# Patient Record
Sex: FEMALE | URBAN - NONMETROPOLITAN AREA
[De-identification: names, ages, dates, MRNs, and addresses within clinical notes are randomized per-mention and may not be internally consistent; named-entity substitution may affect disease eponyms.]

---

## 2017-01-30 ENCOUNTER — APPOINTMENT (RX ONLY)
Dept: URBAN - NONMETROPOLITAN AREA CLINIC 7 | Facility: CLINIC | Age: 82
Setting detail: DERMATOLOGY
End: 2017-01-30

## 2017-01-30 VITALS — HEIGHT: 68 IN | WEIGHT: 145 LBS

## 2017-01-30 DIAGNOSIS — Z71.89 OTHER SPECIFIED COUNSELING: ICD-10-CM

## 2017-01-30 DIAGNOSIS — D485 NEOPLASM OF UNCERTAIN BEHAVIOR OF SKIN: ICD-10-CM

## 2017-01-30 DIAGNOSIS — L82.1 OTHER SEBORRHEIC KERATOSIS: ICD-10-CM

## 2017-01-30 DIAGNOSIS — L57.0 ACTINIC KERATOSIS: ICD-10-CM

## 2017-01-30 PROBLEM — D48.5 NEOPLASM OF UNCERTAIN BEHAVIOR OF SKIN: Status: ACTIVE | Noted: 2017-01-30

## 2017-01-30 PROCEDURE — 11100: CPT | Mod: 59

## 2017-01-30 PROCEDURE — ? OBSERVATION

## 2017-01-30 PROCEDURE — ? LIQUID NITROGEN

## 2017-01-30 PROCEDURE — ? BIOPSY BY SHAVE METHOD

## 2017-01-30 PROCEDURE — ? COUNSELING

## 2017-01-30 PROCEDURE — 99202 OFFICE O/P NEW SF 15 MIN: CPT | Mod: 25

## 2017-01-30 PROCEDURE — 17000 DESTRUCT PREMALG LESION: CPT

## 2017-01-30 ASSESSMENT — LOCATION SIMPLE DESCRIPTION DERM
LOCATION SIMPLE: NOSE
LOCATION SIMPLE: RIGHT UPPER BACK
LOCATION SIMPLE: LEFT CHEEK

## 2017-01-30 ASSESSMENT — LOCATION ZONE DERM
LOCATION ZONE: NOSE
LOCATION ZONE: TRUNK
LOCATION ZONE: FACE

## 2017-01-30 ASSESSMENT — LOCATION DETAILED DESCRIPTION DERM
LOCATION DETAILED: RIGHT MEDIAL UPPER BACK
LOCATION DETAILED: LEFT SUPERIOR CENTRAL MALAR CHEEK
LOCATION DETAILED: RIGHT NASAL DORSUM

## 2017-01-30 NOTE — HPI: SKIN LESION
Is This A New Presentation, Or A Follow-Up?: Growth
Has Your Skin Lesion Been Treated?: not been treated

## 2017-01-30 NOTE — PROCEDURE: LIQUID NITROGEN
Post-Care Instructions: I reviewed with the patient in detail post-care instructions. Patient is to wear sunprotection, and avoid picking at any of the treated lesions. Pt may apply Vaseline to crusted or scabbing areas.\\nWHAT TO EXPECT AFTER CRYOSURGERY (LIQUID NITROGEN) TREATMENT\\n\\n\\n Liquid Nitrogen is a very cold gas. In this liquid state it has a temperature of 320 degrees below zero Fahrenheit. Dermatologists use liquid nitrogen to treat certain skin growths such as warts, seborrheic and actinic keratoses, and a whole variety of other skin tumors. These skin growths are destroyed by the freezing action of this agent. With cryosurgery we have the advantage of being able to treat many skin growths and leave very little scarring. \\n\\n From a few hours to a few days after treatment, the area may blister, turn black, or form a scab. This is a desirable result. In some, no reaction is apparent.\\n\\n 1. You are allowed to get the area wet even immediately after treatment.\\n\\n 2. Most person have little or no pain from this treatment. You may have some swelling about the eyes, if cyrotherapy is performed on the forehead or temple.\\n\\n 3. It is not necessary to cover the area with a bandage. In fact, this is undesirable. Things heal better if left open to the air. You should protect the area from injury as much as possible. \\n\\n 4. Painful large blisters (even blisters filled with blood) can occur at times. These can be opened and the fluid drained out to relieve the pain. This may be done with a needle which has been cleaned with alcohol. \\n\\n 5. As the treated area heals the unwanted skin growth will fall off. This will take several days to weeks depending on the size and nature of the growth treated, the location on the skn and the way your body heals. \\n\\n 6. Allow the growth to fall off by itself - don't pick it or pull it off.\\n\\n 7. When the growth does come off, the skin underneath will be somewhat red. As time passes it will assume the color of normal skin. Don't bandage, pick at or apply any medications to the site after the growth has fallen off. The area may be sensitive to touch and be itchy as it heals. This is normal and it may take some time before it is exactly like the skin around it once more. \\n\\n\\n If you have any questions or concerns, please contact our office:         [place] [phone]
Render Post-Care Instructions In Note?: yes
Detail Level: Detailed
Number Of Freeze-Thaw Cycles: 1 freeze-thaw cycle
Consent: The patient's consent was obtained including but not limited to risks of crusting, scabbing, blistering, scarring, darker or lighter pigmentary change, recurrence, incomplete removal and infection.
Duration Of Freeze Thaw-Cycle (Seconds): 0

## 2017-01-30 NOTE — PROCEDURE: BIOPSY BY SHAVE METHOD
Cryotherapy Text: The wound bed was treated with cryotherapy after the biopsy was performed.
Post-Care Instructions: I reviewed with the patient in detail post-care instructions. Patient is to keep the biopsy site dry overnight, and then clean wound with soap and water then apply vaseline once a day.
Notification Instructions: Patient will be notified of biopsy results. However, patient instructed to call the office if not contacted within 2 weeks.
Render Post-Care Instructions In Note?: yes
Electrodesiccation And Curettage Text: The wound bed was treated with electrodesiccation and curettage after the biopsy was performed.
Wound Care: Vaseline
Bill 22027 For Specimen Handling/Conveyance To Laboratory?: no
Anesthesia Volume In Cc (Will Not Render If 0): 0.5
Detail Level: Detailed
Curettage Text: The wound bed was treated with curettage after the biopsy was performed.
Hemostasis: Drysol
Consent: Written consent was obtained and risks were reviewed including but not limited to scarring, infection, bleeding, scabbing, incomplete removal, nerve damage and allergy to anesthesia.
X Size Of Lesion In Cm: 0
Anesthesia Type: 1% lidocaine with epinephrine
Type Of Destruction Used: Curettage
Size Of Lesion In Cm: 0.4
Lab Facility: [id number]
Dressing: bandage
Biopsy Method: Double edge Personna blades
Silver Nitrate Text: The wound bed was treated with silver nitrate after the biopsy was performed.
Electrodesiccation Text: The wound bed was treated with electrodesiccation after the biopsy was performed.
Billing Type: Third-Party Bill
Biopsy Type: H and E
Lab: [id number]

## 2018-06-19 ENCOUNTER — APPOINTMENT (RX ONLY)
Dept: URBAN - NONMETROPOLITAN AREA CLINIC 7 | Facility: CLINIC | Age: 83
Setting detail: DERMATOLOGY
End: 2018-06-19

## 2018-06-19 DIAGNOSIS — Z71.89 OTHER SPECIFIED COUNSELING: ICD-10-CM

## 2018-06-19 DIAGNOSIS — L82.1 OTHER SEBORRHEIC KERATOSIS: ICD-10-CM

## 2018-06-19 DIAGNOSIS — L57.0 ACTINIC KERATOSIS: ICD-10-CM

## 2018-06-19 PROCEDURE — ? SUNSCREEN RECOMMENDATIONS

## 2018-06-19 PROCEDURE — ? EDUCATIONAL RESOURCES PROVIDED

## 2018-06-19 PROCEDURE — 99213 OFFICE O/P EST LOW 20 MIN: CPT | Mod: 25

## 2018-06-19 PROCEDURE — ? COUNSELING

## 2018-06-19 PROCEDURE — 17000 DESTRUCT PREMALG LESION: CPT

## 2018-06-19 PROCEDURE — ? LIQUID NITROGEN

## 2018-06-19 PROCEDURE — ? OBSERVATION

## 2018-06-19 ASSESSMENT — LOCATION SIMPLE DESCRIPTION DERM
LOCATION SIMPLE: NOSE
LOCATION SIMPLE: UPPER BACK

## 2018-06-19 ASSESSMENT — LOCATION DETAILED DESCRIPTION DERM
LOCATION DETAILED: SUPERIOR THORACIC SPINE
LOCATION DETAILED: NASAL SUPRATIP

## 2018-06-19 ASSESSMENT — LOCATION ZONE DERM
LOCATION ZONE: TRUNK
LOCATION ZONE: NOSE

## 2018-06-19 ASSESSMENT — PAIN INTENSITY VAS: HOW INTENSE IS YOUR PAIN 0 BEING NO PAIN, 10 BEING THE MOST SEVERE PAIN POSSIBLE?: NO PAIN

## 2018-06-19 NOTE — PROCEDURE: LIQUID NITROGEN
Render Post-Care Instructions In Note?: yes
Consent: The patient's consent was obtained including but not limited to risks of crusting, scabbing, blistering, scarring, darker or lighter pigmentary change, recurrence, incomplete removal and infection.
Detail Level: Detailed
Duration Of Freeze Thaw-Cycle (Seconds): 0
Post-Care Instructions: I reviewed with the patient in detail post-care instructions. Patient is to wear sunprotection, and avoid picking at any of the treated lesions. Pt may apply Vaseline to crusted or scabbing areas.\\nWHAT TO EXPECT AFTER CRYOSURGERY (LIQUID NITROGEN) TREATMENT\\n\\n\\n Liquid Nitrogen is a very cold gas. In this liquid state it has a temperature of 320 degrees below zero Fahrenheit. Dermatologists use liquid nitrogen to treat certain skin growths such as warts, seborrheic and actinic keratoses, and a whole variety of other skin tumors. These skin growths are destroyed by the freezing action of this agent. With cryosurgery we have the advantage of being able to treat many skin growths and leave very little scarring. \\n\\n From a few hours to a few days after treatment, the area may blister, turn black, or form a scab. This is a desirable result. In some, no reaction is apparent.\\n\\n 1. You are allowed to get the area wet even immediately after treatment.\\n\\n 2. Most person have little or no pain from this treatment. You may have some swelling about the eyes, if cyrotherapy is performed on the forehead or temple.\\n\\n 3. It is not necessary to cover the area with a bandage. In fact, this is undesirable. Things heal better if left open to the air. You should protect the area from injury as much as possible. \\n\\n 4. Painful large blisters (even blisters filled with blood) can occur at times. These can be opened and the fluid drained out to relieve the pain. This may be done with a needle which has been cleaned with alcohol. \\n\\n 5. As the treated area heals the unwanted skin growth will fall off. This will take several days to weeks depending on the size and nature of the growth treated, the location on the skn and the way your body heals. \\n\\n 6. Allow the growth to fall off by itself - don't pick it or pull it off.\\n\\n 7. When the growth does come off, the skin underneath will be somewhat red. As time passes it will assume the color of normal skin. Don't bandage, pick at or apply any medications to the site after the growth has fallen off. The area may be sensitive to touch and be itchy as it heals. This is normal and it may take some time before it is exactly like the skin around it once more. \\n\\n\\n If you have any questions or concerns, please contact our office:         [place] [phone]

## 2018-11-13 ENCOUNTER — APPOINTMENT (RX ONLY)
Dept: URBAN - NONMETROPOLITAN AREA CLINIC 7 | Facility: CLINIC | Age: 83
Setting detail: DERMATOLOGY
End: 2018-11-13

## 2018-11-13 DIAGNOSIS — L73.8 OTHER SPECIFIED FOLLICULAR DISORDERS: ICD-10-CM

## 2018-11-13 DIAGNOSIS — L57.0 ACTINIC KERATOSIS: ICD-10-CM

## 2018-11-13 DIAGNOSIS — Z71.89 OTHER SPECIFIED COUNSELING: ICD-10-CM

## 2018-11-13 PROCEDURE — 17000 DESTRUCT PREMALG LESION: CPT

## 2018-11-13 PROCEDURE — 99213 OFFICE O/P EST LOW 20 MIN: CPT | Mod: 25

## 2018-11-13 PROCEDURE — ? EDUCATIONAL RESOURCES PROVIDED

## 2018-11-13 PROCEDURE — ? OBSERVATION

## 2018-11-13 PROCEDURE — ? SUNSCREEN RECOMMENDATIONS

## 2018-11-13 PROCEDURE — ? COUNSELING

## 2018-11-13 PROCEDURE — ? LIQUID NITROGEN

## 2018-11-13 ASSESSMENT — PAIN INTENSITY VAS: HOW INTENSE IS YOUR PAIN 0 BEING NO PAIN, 10 BEING THE MOST SEVERE PAIN POSSIBLE?: NO PAIN

## 2018-11-13 ASSESSMENT — LOCATION SIMPLE DESCRIPTION DERM: LOCATION SIMPLE: RIGHT FOREHEAD

## 2018-11-13 ASSESSMENT — LOCATION DETAILED DESCRIPTION DERM: LOCATION DETAILED: RIGHT INFERIOR MEDIAL FOREHEAD

## 2018-11-13 ASSESSMENT — LOCATION ZONE DERM: LOCATION ZONE: FACE

## 2018-11-13 NOTE — PROCEDURE: LIQUID NITROGEN
Render Post-Care Instructions In Note?: yes
Duration Of Freeze Thaw-Cycle (Seconds): 0
Post-Care Instructions: I reviewed with the patient in detail post-care instructions. Patient is to wear sunprotection, and avoid picking at any of the treated lesions. Pt may apply Vaseline to crusted or scabbing areas.\\nWHAT TO EXPECT AFTER CRYOSURGERY (LIQUID NITROGEN) TREATMENT\\n\\n\\n Liquid Nitrogen is a very cold gas. In this liquid state it has a temperature of 320 degrees below zero Fahrenheit. Dermatologists use liquid nitrogen to treat certain skin growths such as warts, seborrheic and actinic keratoses, and a whole variety of other skin tumors. These skin growths are destroyed by the freezing action of this agent. With cryosurgery we have the advantage of being able to treat many skin growths and leave very little scarring. \\n\\n From a few hours to a few days after treatment, the area may blister, turn black, or form a scab. This is a desirable result. In some, no reaction is apparent.\\n\\n 1. You are allowed to get the area wet even immediately after treatment.\\n\\n 2. Most person have little or no pain from this treatment. You may have some swelling about the eyes, if cyrotherapy is performed on the forehead or temple.\\n\\n 3. It is not necessary to cover the area with a bandage. In fact, this is undesirable. Things heal better if left open to the air. You should protect the area from injury as much as possible. \\n\\n 4. Painful large blisters (even blisters filled with blood) can occur at times. These can be opened and the fluid drained out to relieve the pain. This may be done with a needle which has been cleaned with alcohol. \\n\\n 5. As the treated area heals the unwanted skin growth will fall off. This will take several days to weeks depending on the size and nature of the growth treated, the location on the skn and the way your body heals. \\n\\n 6. Allow the growth to fall off by itself - don't pick it or pull it off.\\n\\n 7. When the growth does come off, the skin underneath will be somewhat red. As time passes it will assume the color of normal skin. Don't bandage, pick at or apply any medications to the site after the growth has fallen off. The area may be sensitive to touch and be itchy as it heals. This is normal and it may take some time before it is exactly like the skin around it once more. \\n\\n\\n If you have any questions or concerns, please contact our office:         [place] [phone]
Detail Level: Detailed
Consent: The patient's consent was obtained including but not limited to risks of crusting, scabbing, blistering, scarring, darker or lighter pigmentary change, recurrence, incomplete removal and infection.

## 2023-03-08 ENCOUNTER — APPOINTMENT (RX ONLY)
Dept: URBAN - NONMETROPOLITAN AREA CLINIC 7 | Facility: CLINIC | Age: 88
Setting detail: DERMATOLOGY
End: 2023-03-08

## 2023-03-08 DIAGNOSIS — D18.0 HEMANGIOMA: ICD-10-CM

## 2023-03-08 DIAGNOSIS — L82.1 OTHER SEBORRHEIC KERATOSIS: ICD-10-CM

## 2023-03-08 PROBLEM — D18.01 HEMANGIOMA OF SKIN AND SUBCUTANEOUS TISSUE: Status: ACTIVE | Noted: 2023-03-08

## 2023-03-08 PROCEDURE — ? OBSERVATION

## 2023-03-08 PROCEDURE — 99202 OFFICE O/P NEW SF 15 MIN: CPT

## 2023-03-08 PROCEDURE — ? COUNSELING

## 2023-03-08 ASSESSMENT — LOCATION DETAILED DESCRIPTION DERM
LOCATION DETAILED: PERIUMBILICAL SKIN
LOCATION DETAILED: SUPERIOR THORACIC SPINE
LOCATION DETAILED: RIGHT MEDIAL UPPER BACK
LOCATION DETAILED: RIGHT VENTRAL PROXIMAL FOREARM

## 2023-03-08 ASSESSMENT — LOCATION SIMPLE DESCRIPTION DERM
LOCATION SIMPLE: RIGHT UPPER BACK
LOCATION SIMPLE: RIGHT FOREARM
LOCATION SIMPLE: ABDOMEN
LOCATION SIMPLE: UPPER BACK

## 2023-03-08 ASSESSMENT — LOCATION ZONE DERM
LOCATION ZONE: ARM
LOCATION ZONE: TRUNK

## 2023-03-08 NOTE — PROCEDURE: MIPS QUALITY
Quality 431: Preventive Care And Screening: Unhealthy Alcohol Use - Screening: Patient not identified as an unhealthy alcohol user when screened for unhealthy alcohol use using a systematic screening method
Detail Level: Detailed
Quality 110: Preventive Care And Screening: Influenza Immunization: Influenza Immunization previously received during influenza season
Additional Notes: Patient has had all Covid injections.